# Patient Record
Sex: MALE | Race: WHITE | Employment: STUDENT | URBAN - METROPOLITAN AREA
[De-identification: names, ages, dates, MRNs, and addresses within clinical notes are randomized per-mention and may not be internally consistent; named-entity substitution may affect disease eponyms.]

---

## 2018-09-27 ENCOUNTER — OFFICE VISIT (OUTPATIENT)
Dept: NURSING | Facility: CLINIC | Age: 28
End: 2018-09-27
Payer: COMMERCIAL

## 2018-09-27 VITALS — DIASTOLIC BLOOD PRESSURE: 84 MMHG | SYSTOLIC BLOOD PRESSURE: 124 MMHG | HEART RATE: 60 BPM | TEMPERATURE: 97.8 F

## 2018-09-27 DIAGNOSIS — R79.1 ABNORMAL COAGULATION PROFILE: Primary | ICD-10-CM

## 2018-09-27 PROCEDURE — 36415 COLL VENOUS BLD VENIPUNCTURE: CPT | Performed by: INTERNAL MEDICINE

## 2018-09-27 PROCEDURE — 86706 HEP B SURFACE ANTIBODY: CPT | Performed by: INTERNAL MEDICINE

## 2018-09-27 PROCEDURE — 99207 ZZC NO CHARGE NURSE ONLY: CPT

## 2018-09-28 ENCOUNTER — NURSE TRIAGE (OUTPATIENT)
Dept: NURSING | Facility: CLINIC | Age: 28
End: 2018-09-28

## 2018-09-28 NOTE — TELEPHONE ENCOUNTER
Called for lab results, informed pt lab is still in process. He inquired if the results would be placed in MyChart and let him know that once the results are back and reviewed by a doctor they will either call him or update to [facility] with the results.    Reason for Disposition    Caller requesting lab results    Protocols used: PCP CALL - NO TRIAGE-ADULT-

## 2018-10-01 LAB — HBV SURFACE AB SERPL IA-ACNC: >1000 M[IU]/ML

## 2020-03-11 ENCOUNTER — HEALTH MAINTENANCE LETTER (OUTPATIENT)
Age: 30
End: 2020-03-11